# Patient Record
Sex: FEMALE | Race: WHITE | URBAN - METROPOLITAN AREA
[De-identification: names, ages, dates, MRNs, and addresses within clinical notes are randomized per-mention and may not be internally consistent; named-entity substitution may affect disease eponyms.]

---

## 2020-02-03 ENCOUNTER — OFFICE VISIT (OUTPATIENT)
Dept: NEUROLOGY | Age: 45
End: 2020-02-03

## 2020-02-03 VITALS
HEIGHT: 62 IN | HEART RATE: 69 BPM | SYSTOLIC BLOOD PRESSURE: 122 MMHG | BODY MASS INDEX: 37.13 KG/M2 | DIASTOLIC BLOOD PRESSURE: 81 MMHG | WEIGHT: 201.8 LBS | OXYGEN SATURATION: 96 %

## 2020-02-03 DIAGNOSIS — S64.8X2A: ICD-10-CM

## 2020-02-03 DIAGNOSIS — E66.01 SEVERE OBESITY (HCC): ICD-10-CM

## 2021-06-04 ENCOUNTER — TRANSCRIBE ORDER (OUTPATIENT)
Dept: SCHEDULING | Age: 46
End: 2021-06-04

## 2021-06-04 DIAGNOSIS — M25.572 LEFT ANKLE PAIN: Primary | ICD-10-CM

## 2021-06-04 DIAGNOSIS — M89.8X7 EXOSTOSIS OF BONE OF ANKLE: ICD-10-CM

## 2022-03-19 PROBLEM — K44.9 HIATAL HERNIA WITH GASTROESOPHAGEAL REFLUX: Status: ACTIVE | Noted: 2018-03-30

## 2022-03-19 PROBLEM — K21.9 HIATAL HERNIA WITH GASTROESOPHAGEAL REFLUX: Status: ACTIVE | Noted: 2018-03-30

## 2022-03-20 PROBLEM — E66.01 SEVERE OBESITY: Status: ACTIVE | Noted: 2020-02-03

## 2024-08-15 ENCOUNTER — HOSPITAL ENCOUNTER (OUTPATIENT)
Facility: HOSPITAL | Age: 49
Discharge: HOME | End: 2024-08-15
Payer: MEDICAID

## 2024-08-15 DIAGNOSIS — M25.551 RIGHT HIP PAIN: ICD-10-CM

## 2024-08-15 PROCEDURE — 73721 MRI JNT OF LWR EXTRE W/O DYE: CPT

## 2025-01-31 ENCOUNTER — OFFICE VISIT (OUTPATIENT)
Age: 50
End: 2025-01-31
Payer: MEDICAID

## 2025-01-31 VITALS
SYSTOLIC BLOOD PRESSURE: 124 MMHG | BODY MASS INDEX: 47.15 KG/M2 | DIASTOLIC BLOOD PRESSURE: 74 MMHG | HEART RATE: 92 BPM | RESPIRATION RATE: 18 BRPM | HEIGHT: 62 IN | OXYGEN SATURATION: 97 % | WEIGHT: 256.2 LBS

## 2025-01-31 DIAGNOSIS — Z51.81 THERAPEUTIC DRUG MONITORING: ICD-10-CM

## 2025-01-31 DIAGNOSIS — F41.9 ANXIETY AND DEPRESSION: ICD-10-CM

## 2025-01-31 DIAGNOSIS — F32.A ANXIETY AND DEPRESSION: ICD-10-CM

## 2025-01-31 DIAGNOSIS — R41.89 COGNITIVE IMPAIRMENT: Primary | ICD-10-CM

## 2025-01-31 PROCEDURE — 95816 EEG AWAKE AND DROWSY: CPT | Performed by: PSYCHIATRY & NEUROLOGY

## 2025-01-31 PROCEDURE — 99204 OFFICE O/P NEW MOD 45 MIN: CPT | Performed by: PSYCHIATRY & NEUROLOGY

## 2025-01-31 RX ORDER — HYDROXYCHLOROQUINE SULFATE 200 MG/1
400 TABLET, FILM COATED ORAL DAILY
COMMUNITY
Start: 2025-01-07

## 2025-01-31 RX ORDER — TOPIRAMATE 200 MG/1
200 TABLET, FILM COATED ORAL 2 TIMES DAILY
COMMUNITY
Start: 2025-01-07

## 2025-01-31 RX ORDER — VENLAFAXINE HYDROCHLORIDE 75 MG/1
CAPSULE, EXTENDED RELEASE ORAL DAILY
COMMUNITY
Start: 2025-01-07

## 2025-01-31 RX ORDER — ERGOCALCIFEROL 1.25 MG/1
50000 CAPSULE, LIQUID FILLED ORAL WEEKLY
COMMUNITY
Start: 2025-01-07

## 2025-01-31 NOTE — PROGRESS NOTES
NEUROLOGY CLINIC NOTE    Patient ID:  Myrna Lam  [id number]  49 y.o.  1975    Date of Consultation:  January 31, 2025    Referring Physician: Dr. Kiran Peralta    Reason for Consultation:  cognitive issues    Chief Complaint   Patient presents with    New Patient     Patient referred by Dr. Kiran Peralta MD from [facility], for attention and concentration deficit. Patient reports she has concentration for about 3 years and has gotten worse over the last 6 months.       History of Present Illness:     Patient Active Problem List    Diagnosis Date Noted    Severe obesity 02/03/2020     No past medical history on file.   No past surgical history on file.     Current Outpatient Medications on File Prior to Visit   Medication Sig Dispense Refill    venlafaxine (EFFEXOR XR) 75 MG extended release capsule Take by mouth daily      topiramate (TOPAMAX) 200 MG tablet Take 1 tablet by mouth 2 times daily      hydroxychloroquine (PLAQUENIL) 200 MG tablet Take 2 tablets by mouth daily      vitamin D (ERGOCALCIFEROL) 1.25 MG (29542 UT) CAPS capsule Take 1 capsule by mouth Once a week at 5 PM       No current facility-administered medications on file prior to visit.       Allergies   Allergen Reactions    Demerol Hcl [Meperidine] Anaphylaxis    Hydromorphone Anaphylaxis    Morphine Anaphylaxis      Social History     Tobacco Use    Smoking status: Former     Types: Cigarettes    Smokeless tobacco: Not on file   Substance Use Topics    Alcohol use: Not Currently      No family history on file.     Subjective:      Myrna Lam is a 49 y.o. female obese RH with history of bipolar disorder, anxiety, depression, vitamin D deficiency and rheumatological issues who was referred here by Dr. Kiran Peralta for further evaluation of her cognitive issues.    Condition ongoing for about 3 to 3 1/2 years  Slowness of her thought process  Issues with attention and focus  Difficulty with want she wants to say  Read a recipe

## 2025-02-03 LAB — TOPIRAMATE SERPL-MCNC: <1.5 UG/ML (ref 2–25)

## 2025-02-07 ENCOUNTER — PROCEDURE VISIT (OUTPATIENT)
Age: 50
End: 2025-02-07
Payer: MEDICAID

## 2025-02-07 DIAGNOSIS — R41.89 COGNITIVE IMPAIRMENT: Primary | ICD-10-CM

## 2025-02-07 PROCEDURE — 95816 EEG AWAKE AND DROWSY: CPT | Performed by: PSYCHIATRY & NEUROLOGY

## 2025-02-18 ENCOUNTER — TELEMEDICINE (OUTPATIENT)
Age: 50
End: 2025-02-18

## 2025-02-18 DIAGNOSIS — F41.1 GENERALIZED ANXIETY DISORDER: ICD-10-CM

## 2025-02-18 DIAGNOSIS — Z81.8 MATERNAL FAMILY HISTORY OF DEMENTIA: ICD-10-CM

## 2025-02-18 DIAGNOSIS — G31.84 MILD COGNITIVE IMPAIRMENT: Primary | ICD-10-CM

## 2025-02-18 DIAGNOSIS — F31.9 BIPOLAR 1 DISORDER (HCC): ICD-10-CM

## 2025-02-18 DIAGNOSIS — R41.89 COGNITIVE IMPAIRMENT: ICD-10-CM

## 2025-02-18 DIAGNOSIS — R41.89 COGNITIVE DECLINE: ICD-10-CM

## 2025-02-18 DIAGNOSIS — R47.89 WORD FINDING DIFFICULTY: ICD-10-CM

## 2025-02-18 NOTE — PROGRESS NOTES
Myrna Lam, was evaluated through a synchronous (real-time) audio-video encounter. The patient (or guardian if applicable) is aware that this is a billable service, which includes applicable co-pays. This Virtual Visit was conducted with patient's (and/or legal guardian's) consent. Patient identification was verified, and a caregiver was present when appropriate.   The patient was located at Home: [address] [place] [address].  [facility] [address]  Provider was located at Facility (Appt Dept): [address]  [address]  [place] [address]  Confirm you are appropriately licensed, registered, or certified to deliver care in the state where the patient is located as indicated above. If you are not or unsure, please re-schedule the visit: Yes, I confirm.     Myrna Lam (:  1975) is a New patient, presenting virtually for evaluation of the following:      [facility]  NEUROLOGY CLINIC   [address]   [place], [place] [address] [phone] Office   [phone] Fax      Neuropsychology    Initial Diagnostic Interview Note      Referral:  No primary care provider on file.    Myrna Lam is a 49 y.o. right handed  female ( and in a 12 year relationship) who was unaccompanied to the initial clinical interview on 2025.  Please refer to her medical records for details pertaining to her history.   At the start of the appointment, I reviewed the patient's [facility] Epic Chart (including Media scanned in from previous providers) for the active Problem List, all pertinent Past Medical Hx, medications, recent radiologic and laboratory findings.  In addition, I reviewed pt's documented Immunization Record and Encounter History.     Chief Complaint: New patient, establish care, for neurocognitive and psychologic concerns, as outlined above.     She completed the 10th grade and part of 11th and got pregnant

## 2025-02-26 NOTE — PROCEDURES
ELECTRODIAGNOSTIC REPORT      Test Date:  2/3/2020    Patient: Shauna Escobedo : 1975 Physician: María Alvarez M.D.   ID#: [id number] SEX: Female Ref. Phys: María Alvarez M.D. Patient History / Exam:  Left upper extremity sensory loss. EMG & NCV Findings:  Evaluation of the left median motor nerve showed normal distal onset latency (3.4 ms), normal amplitude (10.0 mV), and normal conduction velocity (Elbow-Wrist, 65 m/s). The left ulnar motor nerve showed normal distal onset latency (2.9 ms), reduced amplitude (6.4 mV), decreased conduction velocity (Wrist-Abd Dig Minimi, 28 m/s), normal conduction velocity (B Elbow-Wrist, 67 m/s), and normal conduction velocity (A Elbow-B Elbow, 77 m/s). The left median sensory and the right median sensory nerves showed normal distal onset latency (L2.9, R3.2 ms), normal distal peak latency (L3.6, R4.0 ms), and normal amplitude (L39.8, R29.9 µV). The left radial sensory, the left ulnar sensory, and the right ulnar sensory nerves showed normal distal peak latency (L1.9, L3.3, R3.0 ms) and normal amplitude (L43.5, L14.1, R10.7 µV). The left median/ulnar (palm) comparison nerve showed normal distal peak latency (Median Palm, 2.2 ms), reduced amplitude (Median Palm, 38.8 µV), prolonged distal peak latency (Ulnar Palm, 2.3 ms), normal amplitude (Ulnar Palm, 12.6 µV), and normal peak latency difference (Median Palm-Ulnar Palm, 0.1 ms). All left vs. right side differences were within normal limits. All F Wave latencies were within normal limits. All examined muscles (as indicated in the following table) showed no evidence of electrical instability.       Impression   Normal study         ___________________________  María Alvarez M.D.    Nerve Conduction Studies  Anti Sensory Summary Table     Stim Site NR Onset (ms) Peak (ms) O-P Amp (µV) Norm Peak (ms) Norm O-P Amp Site1 Site2 Dist (cm) Norm Marv (m/s)   Left Median Anti Sensory (2nd Digit)   Wrist    2.9 3.6 The cast is to remain clean and dry at all times. Do not stick anything down the cast.  If the cast does become saturated, make an appointment at the office as soon as possible.    39.8 <4 >11 Wrist 2nd Digit 14.0    Right Median Anti Sensory (2nd Digit)   Wrist    3.2 4.0 29.9 <4 >11 Wrist 2nd Digit 14.0    Left Radial Anti Sensory (Base 1st Digit)   Wrist    1.4 1.9 43.5 <2.8 7 Wrist Base 1st Digit 10.0    Left Ulnar Anti Sensory (5th Digit)   Wrist    1.9 3.3 14.1 <4.0 >10 Wrist 5th Digit 14.0    Right Ulnar Anti Sensory (5th Digit)   Wrist    2.2 3.0 10.7 <4.0 >10 Wrist 5th Digit 14.0      Motor Summary Table     Stim Site NR Onset (ms) Norm Onset (ms) O-P Amp (mV) Norm O-P Amp P-T Amp (mV) Site1 Site2 Dist (cm) Marv (m/s)   Left Median Motor (Abd Poll Brev)   Wrist    3.4 <4.5 10.0 >4.1  Wrist Abd Poll Brev 8.0 24   Elbow    6.8  9.5   Elbow Wrist 22.0 65   Left Ulnar Motor (Abd Dig Minimi)   Wrist    2.9 <3.1 6.4 >7.0  Wrist Abd Dig Minimi 8.0 28   B Elbow    5.9  6.3   B Elbow Wrist 20.0 67   A Elbow    7.2  6.1   A Elbow B Elbow 10.0 77     Comparison Summary Table     Stim Site NR Peak (ms) P-T Amp (µV) Site1 Site2 Dist (cm) Delta-P (ms)   Left Median/Ulnar Palm Comparison (Wrist)   Median Palm    2.2 56.3 Median Palm Ulnar Palm 8.0 0.1   Ulnar Palm    2.3 19.9         F Wave Studies     NR F-Lat (ms) Lat Norm (ms) L-R F-Lat (ms) L-R Lat Norm   Left Ulnar (Mrkrs) (Abd Dig Min)      23.47 <32  <2.5       EMG     Side Muscle Nerve Root Ins Act Fibs Psw Recrt Duration Amp Poly Comment   Left ABD Dig Min Ulnar C8-T1 Nml Nml Nml Nml Nml Nml Nml    Left Oppens Policis Median Q1-O6 Nml Nml Nml Nml Nml Nml Nml    Left 1stDorInt Ulnar C8-T1 Nml Nml Nml Nml Nml Nml Nml    Left FlexCarpiUln Ulnar C8,T1 Nml Nml Nml Nml Nml Nml Nml      Waveforms:

## 2025-03-07 ENCOUNTER — PROCEDURE VISIT (OUTPATIENT)
Age: 50
End: 2025-03-07
Payer: MEDICAID

## 2025-03-07 DIAGNOSIS — R45.851 PASSIVE SUICIDAL IDEATIONS: ICD-10-CM

## 2025-03-07 DIAGNOSIS — F41.9 SEVERE ANXIETY: ICD-10-CM

## 2025-03-07 DIAGNOSIS — F43.9 STRESS: ICD-10-CM

## 2025-03-07 DIAGNOSIS — F90.0 ATTENTION DEFICIT HYPERACTIVITY DISORDER (ADHD), INATTENTIVE TYPE, MODERATE: Chronic | ICD-10-CM

## 2025-03-07 DIAGNOSIS — Z86.59 HISTORY OF BIPOLAR DISORDER: ICD-10-CM

## 2025-03-07 DIAGNOSIS — F43.10 PTSD (POST-TRAUMATIC STRESS DISORDER): ICD-10-CM

## 2025-03-07 DIAGNOSIS — F45.0 SOMATIZATION DISORDER: ICD-10-CM

## 2025-03-07 DIAGNOSIS — F32.2 SEVERE MAJOR DEPRESSION (HCC): ICD-10-CM

## 2025-03-07 DIAGNOSIS — R41.3 FUNCTIONAL MEMORY PROBLEM: ICD-10-CM

## 2025-03-07 DIAGNOSIS — G31.84 MILD COGNITIVE IMPAIRMENT: Primary | ICD-10-CM

## 2025-03-07 PROCEDURE — 96132 NRPSYC TST EVAL PHYS/QHP 1ST: CPT | Performed by: CLINICAL NEUROPSYCHOLOGIST

## 2025-03-07 PROCEDURE — 96133 NRPSYC TST EVAL PHYS/QHP EA: CPT | Performed by: CLINICAL NEUROPSYCHOLOGIST

## 2025-03-07 PROCEDURE — 96138 PSYCL/NRPSYC TECH 1ST: CPT | Performed by: CLINICAL NEUROPSYCHOLOGIST

## 2025-03-07 PROCEDURE — 96139 PSYCL/NRPSYC TST TECH EA: CPT | Performed by: CLINICAL NEUROPSYCHOLOGIST

## 2025-03-10 NOTE — PROGRESS NOTES
forced choice recall were within normal limits.  This pattern of performance is not indicative of a patient who is at increased risk for day-to-day problems with auditory learning and/or memory.       Simple timed visual motor sequencing (Trailmaking Test Part A) was within the average range with a T score of 54.  Her performance on a similar, but more complex task of timed visual motor sequencing (Trailmaking Test Part B) was within the below average range with a T score of 43.  She made only one  sequencing error on this latter test. Taken together, this pattern of performance is not indicative of a patient who is at increased risk for day-to-day problems with executive functioning.       MMSE score of 27/30 correct is normal..  Recall 1/3.  Not oriented to date. Clock drawing was normal.       Fine motor dexterity, as assessed by the Noble Grooved Pegboard Test, was within the mildly to moderately impaired range bilaterally.  This does not raise concern for a focal or lateralized brain dysfunction.       The patient rated her current level of pain as \"0/5 - No Pain\" on the Rio-Melzack Pain Questionnaire.  She reported pain in her      . Her Castro Depression Inventory -II score of 35 was within the severely depressed range.  Her Castro Anxiety Inventory score of 26 reflected severe anxiety.       The patient was also administered the Personality Assessment Inventory and generated a valid profile for interpretation.  Within this context, there are numerous clinical scale elevations.  Marked depression and anxiety are present.  Her level of anxiety is severe to the degree that her ability to concentrate and to attend are significantly compromised. There is strong support for somatization.  There is strong support for PTSD.  Self-concept is fixed, harsh, and negative.  Notable day-to-day stress and turmoil is reported.  She reports recurrent thoughts of suicide on this measure and denied currently active plan or

## 2025-03-12 ENCOUNTER — TELEPHONE (OUTPATIENT)
Age: 50
End: 2025-03-12

## 2025-03-12 ENCOUNTER — HOSPITAL ENCOUNTER (OUTPATIENT)
Facility: HOSPITAL | Age: 50
Discharge: HOME | End: 2025-03-15
Attending: PSYCHIATRY & NEUROLOGY
Payer: MEDICAID

## 2025-03-12 DIAGNOSIS — R41.89 COGNITIVE IMPAIRMENT: ICD-10-CM

## 2025-03-12 PROCEDURE — 70450 CT HEAD/BRAIN W/O DYE: CPT

## 2025-03-12 NOTE — TELEPHONE ENCOUNTER
Called patient to see if she could come in 3/14/2025 at 8:40 to discuss results of neuropsych and Ct but unable to LM due to voicemail being full.

## 2025-03-14 ENCOUNTER — OFFICE VISIT (OUTPATIENT)
Age: 50
End: 2025-03-14
Payer: MEDICAID

## 2025-03-14 VITALS
HEART RATE: 81 BPM | HEIGHT: 62 IN | OXYGEN SATURATION: 98 % | BODY MASS INDEX: 47.84 KG/M2 | SYSTOLIC BLOOD PRESSURE: 120 MMHG | DIASTOLIC BLOOD PRESSURE: 70 MMHG | TEMPERATURE: 97.7 F | RESPIRATION RATE: 18 BRPM | WEIGHT: 260 LBS

## 2025-03-14 DIAGNOSIS — F32.2 SEVERE MAJOR DEPRESSION (HCC): ICD-10-CM

## 2025-03-14 DIAGNOSIS — F45.0 SOMATIZATION DISORDER: ICD-10-CM

## 2025-03-14 DIAGNOSIS — F43.10 PTSD (POST-TRAUMATIC STRESS DISORDER): ICD-10-CM

## 2025-03-14 DIAGNOSIS — F90.0 ATTENTION DEFICIT HYPERACTIVITY DISORDER (ADHD), INATTENTIVE TYPE, MODERATE: Primary | ICD-10-CM

## 2025-03-14 DIAGNOSIS — F41.9 SEVERE ANXIETY: ICD-10-CM

## 2025-03-14 PROCEDURE — 99215 OFFICE O/P EST HI 40 MIN: CPT | Performed by: PSYCHIATRY & NEUROLOGY

## 2025-03-14 ASSESSMENT — PATIENT HEALTH QUESTIONNAIRE - PHQ9
SUM OF ALL RESPONSES TO PHQ QUESTIONS 1-9: 0
2. FEELING DOWN, DEPRESSED OR HOPELESS: NOT AT ALL
SUM OF ALL RESPONSES TO PHQ QUESTIONS 1-9: 0
1. LITTLE INTEREST OR PLEASURE IN DOING THINGS: NOT AT ALL

## 2025-03-14 NOTE — PROGRESS NOTES
NEUROLOGY CLINIC NOTE    Patient ID:  Myrna Lam  [id number]  49 y.o.  1975    Date of Visit:  2025    Referring Physician: Dr. Kiran Peralta    Reason for Visit:  cognitive issues    Chief Complaint   Patient presents with    Results     Discuss neuropsych results       History of Present Illness:     Patient Active Problem List    Diagnosis Date Noted    Severe obesity 2020     Past Medical History:   Diagnosis Date    Diabetes mellitus (HCC)     27    Headache 10/01/21    10/01/21    Hearing loss 10/01/21    Liver disease 10/01/22    Memory disorder 10/01/23    Migraine 10/01/21    Neuropathy 10/01/21    Sleep difficulties 24    Tremor 06/10/18      No past surgical history on file.     Current Outpatient Medications on File Prior to Visit   Medication Sig Dispense Refill    venlafaxine (EFFEXOR XR) 75 MG extended release capsule Take by mouth daily      topiramate (TOPAMAX) 200 MG tablet Take 1 tablet by mouth 2 times daily      hydroxychloroquine (PLAQUENIL) 200 MG tablet Take 2 tablets by mouth daily      vitamin D (ERGOCALCIFEROL) 1.25 MG (17653 UT) CAPS capsule Take 1 capsule by mouth Once a week at 5 PM       No current facility-administered medications on file prior to visit.       Allergies   Allergen Reactions    Demerol Hcl [Meperidine] Anaphylaxis    Hydromorphone Anaphylaxis    Morphine Anaphylaxis      Social History     Tobacco Use    Smoking status: Former     Current packs/day: 0.00     Average packs/day: 1 pack/day for 35.0 years (35.0 ttl pk-yrs)     Types: Cigarettes     Start date: 1988     Quit date: 2022     Years since quittin.9    Smokeless tobacco: Not on file   Substance Use Topics    Alcohol use: Not Currently      Family History   Problem Relation Age of Onset    Alzheimer's Disease Mother     Dementia Mother     Neuropathy Mother     Parkinsonism Paternal Uncle         Subjective:      Myrna Lam is a 49 y.o. female obese RH with

## 2025-04-15 ENCOUNTER — TELEPHONE (OUTPATIENT)
Age: 50
End: 2025-04-15

## 2025-04-15 NOTE — TELEPHONE ENCOUNTER
The Pt called to cancel her appt. She stated that Dr BENTLEY reviewed her Test Results at the last appt. She doesn't believe she needs to come & review her results a 2nd time  The Pt asked if Dr MILLER Has sandra questions, She requests he or his nurse please give her a call. [phone].

## 2025-07-23 ENCOUNTER — TELEPHONE (OUTPATIENT)
Age: 50
End: 2025-07-23

## 2025-07-23 NOTE — TELEPHONE ENCOUNTER
Requesting medical records for a legal matter.     I advised Cher to contact medical records for a large request. Gave her the phone number.    Records are being requested via this time period.    10- through 07-.    I'm putting the encounter under Kalpesh Collins (for encounter purposes only).  Thanks.